# Patient Record
Sex: MALE | Race: WHITE | NOT HISPANIC OR LATINO | Employment: FULL TIME | URBAN - NONMETROPOLITAN AREA
[De-identification: names, ages, dates, MRNs, and addresses within clinical notes are randomized per-mention and may not be internally consistent; named-entity substitution may affect disease eponyms.]

---

## 2022-08-22 ENCOUNTER — OFFICE VISIT (OUTPATIENT)
Dept: FAMILY MEDICINE CLINIC | Facility: CLINIC | Age: 62
End: 2022-08-22

## 2022-08-22 VITALS
HEART RATE: 77 BPM | WEIGHT: 165 LBS | SYSTOLIC BLOOD PRESSURE: 170 MMHG | HEIGHT: 63 IN | DIASTOLIC BLOOD PRESSURE: 78 MMHG | BODY MASS INDEX: 29.23 KG/M2 | RESPIRATION RATE: 18 BRPM | TEMPERATURE: 98.9 F | OXYGEN SATURATION: 98 %

## 2022-08-22 DIAGNOSIS — M25.562 ACUTE PAIN OF LEFT KNEE: ICD-10-CM

## 2022-08-22 DIAGNOSIS — M54.9 MID BACK PAIN ON RIGHT SIDE: Primary | ICD-10-CM

## 2022-08-22 DIAGNOSIS — M54.50 ACUTE RIGHT-SIDED LOW BACK PAIN, UNSPECIFIED WHETHER SCIATICA PRESENT: ICD-10-CM

## 2022-08-22 DIAGNOSIS — I10 PRIMARY HYPERTENSION: Chronic | ICD-10-CM

## 2022-08-22 PROCEDURE — 99204 OFFICE O/P NEW MOD 45 MIN: CPT | Performed by: NURSE PRACTITIONER

## 2022-08-22 RX ORDER — HYDROCODONE BITARTRATE AND ACETAMINOPHEN 5; 325 MG/1; MG/1
1 TABLET ORAL EVERY 4 HOURS PRN
Qty: 18 TABLET | Refills: 0 | Status: SHIPPED | OUTPATIENT
Start: 2022-08-22 | End: 2022-08-25

## 2022-08-22 NOTE — PROGRESS NOTES
"Chief Complaint  Back Pain (Mid-lower back. Fell out of a work truck ) and Tingling (Bilateral feet after back injury )    Subjective        Byron Lamb presents to [facility]  History of Present Illness    Fell out of truck onto his back on 8/10/22.  He stood up and felt his back pop.  Workmen's comp.  Feet tingling since he fell.  His legs try to buckle when he bears weight.  He has not had any imaging done on his back.    Left knee pain since accident.  Knee swelled for about a week.  Hurts on the right side and the front of his knee.  Knee pops and gives out.  Hurts to go up and down stairs.     He has stopped taking all of medication since his wife  and states that \"if it kills me, it kills me.\"  History of quadruple bypass surgery, he does not take his medications or his aspirin any more.  He smoked a cigarette about 20 minutes ago.    Objective   Vital Signs:  /78   Pulse 77   Temp 98.9 °F (37.2 °C) (Infrared)   Resp 18   Ht 160 cm (63\")   Wt 74.8 kg (165 lb)   SpO2 98%   BMI 29.23 kg/m²   Estimated body mass index is 29.23 kg/m² as calculated from the following:    Height as of this encounter: 160 cm (63\").    Weight as of this encounter: 74.8 kg (165 lb).          Physical Exam  Vitals and nursing note reviewed.   Constitutional:       Appearance: Normal appearance.   HENT:      Head: Normocephalic.      Nose: Nose normal.      Mouth/Throat:      Mouth: Mucous membranes are moist.   Eyes:      Extraocular Movements: Extraocular movements intact.      Pupils: Pupils are equal, round, and reactive to light.   Cardiovascular:      Rate and Rhythm: Normal rate and regular rhythm.      Pulses: Normal pulses.      Heart sounds: Normal heart sounds.   Pulmonary:      Effort: Pulmonary effort is normal.      Breath sounds: Normal breath sounds.   Abdominal:      General: Bowel sounds are normal.      Palpations: Abdomen is soft.   Musculoskeletal: "      Cervical back: Normal range of motion.      Thoracic back: Tenderness present.      Lumbar back: Tenderness present. Positive right straight leg raise test.        Back:       Right knee: Crepitus present.      Left knee: Crepitus present. Tenderness present over the medial joint line and patellar tendon. MCL laxity present. Abnormal patellar mobility.      Instability Tests: Anterior drawer test negative. Posterior drawer test negative.      Right lower leg: No edema.      Left lower leg: No edema.        Legs:       Comments: Right hip 4/5 with flexion and extension.    Left hip 5/5 with flexion and extension.     Skin:     General: Skin is warm and dry.   Neurological:      General: No focal deficit present.      Mental Status: He is alert and oriented to person, place, and time.      GCS: GCS eye subscore is 4. GCS verbal subscore is 5. GCS motor subscore is 6.      Motor: Weakness present.      Gait: Gait abnormal.      Comments: Arthritic gait, unsteady, weakness noted in bilateral legs while walking.   Psychiatric:         Mood and Affect: Mood normal.         Behavior: Behavior normal.        Result Review :                Assessment and Plan   Diagnoses and all orders for this visit:    1. Mid back pain on right side (Primary)  -     XR Spine Thoracic 3 View; Future  -     HYDROcodone-acetaminophen (NORCO) 5-325 MG per tablet; Take 1 tablet by mouth Every 4 (Four) Hours As Needed for Moderate Pain  for up to 3 days.  Dispense: 18 tablet; Refill: 0    2. Acute right-sided low back pain, unspecified whether sciatica present  -     XR Spine Lumbar 4+ View; Future  -     HYDROcodone-acetaminophen (NORCO) 5-325 MG per tablet; Take 1 tablet by mouth Every 4 (Four) Hours As Needed for Moderate Pain  for up to 3 days.  Dispense: 18 tablet; Refill: 0    3. Acute pain of left knee  -     XR Knee 3 View Left; Future    4. Primary hypertension  Assessment & Plan:  Hypertension is newly identified.  Dietary sodium  restriction.  Stop smoking.  Addition of ACE or ARB was discussed but patient refused medication at this time.  Patient refused smoking cessation at this time.  Blood pressure will be reassessed in 2 weeks.    If patient has worsening symptoms of bilateral extremity weakness or tingling, will order an MRI of the lumbar spine.         Follow Up   Return in about 2 weeks (around 9/5/2022).  Patient was given instructions and counseling regarding his condition or for health maintenance advice. Please see specific information pulled into the AVS if appropriate.

## 2022-08-23 DIAGNOSIS — M54.9 MID BACK PAIN ON RIGHT SIDE: ICD-10-CM

## 2022-08-23 DIAGNOSIS — M25.562 ACUTE PAIN OF LEFT KNEE: ICD-10-CM

## 2022-08-23 DIAGNOSIS — M54.50 ACUTE RIGHT-SIDED LOW BACK PAIN, UNSPECIFIED WHETHER SCIATICA PRESENT: ICD-10-CM

## 2022-08-23 NOTE — PROGRESS NOTES
Patient being called to go to [facility] and have another xray of the correct knee

## 2022-08-29 DIAGNOSIS — G95.9 LUMBAR MYELOPATHY: ICD-10-CM

## 2022-08-29 DIAGNOSIS — M54.16 LUMBAR RADICULOPATHY: Primary | ICD-10-CM

## 2022-08-29 DIAGNOSIS — M54.41 ACUTE RIGHT-SIDED LOW BACK PAIN WITH RIGHT-SIDED SCIATICA: ICD-10-CM

## 2022-08-29 PROBLEM — I10 PRIMARY HYPERTENSION: Chronic | Status: ACTIVE | Noted: 2022-08-29

## 2022-08-29 RX ORDER — HYDROCODONE BITARTRATE AND ACETAMINOPHEN 5; 325 MG/1; MG/1
1 TABLET ORAL EVERY 4 HOURS PRN
Qty: 18 TABLET | Refills: 0 | Status: SHIPPED | OUTPATIENT
Start: 2022-08-29 | End: 2022-09-01

## 2022-08-29 NOTE — ASSESSMENT & PLAN NOTE
Hypertension is newly identified.  Dietary sodium restriction.  Stop smoking.  Addition of ACE or ARB was discussed but patient refused medication at this time.  Patient refused smoking cessation at this time.  Blood pressure will be reassessed in 2 weeks.

## 2022-09-02 ENCOUNTER — TELEPHONE (OUTPATIENT)
Dept: FAMILY MEDICINE CLINIC | Facility: CLINIC | Age: 62
End: 2022-09-02

## 2022-10-03 ENCOUNTER — TELEPHONE (OUTPATIENT)
Dept: FAMILY MEDICINE CLINIC | Facility: CLINIC | Age: 62
End: 2022-10-03

## 2022-10-03 NOTE — TELEPHONE ENCOUNTER
Called PT to ask about MRI that was schd on 09/26/2022. PT phone disconnected.  Called [facility] and they have no record of PT MRI.